# Patient Record
Sex: FEMALE | Race: WHITE | NOT HISPANIC OR LATINO | URBAN - METROPOLITAN AREA
[De-identification: names, ages, dates, MRNs, and addresses within clinical notes are randomized per-mention and may not be internally consistent; named-entity substitution may affect disease eponyms.]

---

## 2023-04-19 PROBLEM — 12063002: Status: ACTIVE | Noted: 2023-04-19

## 2023-04-19 PROBLEM — 29857009: Status: ACTIVE | Noted: 2023-04-19

## 2023-04-19 PROBLEM — 428283002: Status: ACTIVE | Noted: 2023-04-19

## 2023-04-20 ENCOUNTER — WEB ENCOUNTER (OUTPATIENT)
Dept: URBAN - METROPOLITAN AREA CLINIC 19 | Facility: CLINIC | Age: 73
End: 2023-04-20

## 2023-04-20 ENCOUNTER — OFFICE VISIT (OUTPATIENT)
Dept: URBAN - METROPOLITAN AREA CLINIC 19 | Facility: CLINIC | Age: 73
End: 2023-04-20
Payer: COMMERCIAL

## 2023-04-20 ENCOUNTER — LAB OUTSIDE AN ENCOUNTER (OUTPATIENT)
Dept: URBAN - METROPOLITAN AREA CLINIC 19 | Facility: CLINIC | Age: 73
End: 2023-04-20

## 2023-04-20 VITALS
WEIGHT: 175.2 LBS | DIASTOLIC BLOOD PRESSURE: 89 MMHG | BODY MASS INDEX: 31.04 KG/M2 | TEMPERATURE: 97.2 F | HEIGHT: 63 IN | SYSTOLIC BLOOD PRESSURE: 150 MMHG

## 2023-04-20 DIAGNOSIS — K62.5 RECTAL BLEED: ICD-10-CM

## 2023-04-20 DIAGNOSIS — R07.89 OTHER CHEST PAIN: ICD-10-CM

## 2023-04-20 DIAGNOSIS — Z86.010 HISTORY OF COLON POLYPS: ICD-10-CM

## 2023-04-20 PROCEDURE — 99204 OFFICE O/P NEW MOD 45 MIN: CPT | Performed by: STUDENT IN AN ORGANIZED HEALTH CARE EDUCATION/TRAINING PROGRAM

## 2023-04-20 RX ORDER — SOD SULF/POT CHLORIDE/MAG SULF 1.479 G
AS DIRECTED TABLET ORAL ONCE
Refills: 0 | OUTPATIENT
Start: 2023-04-20 | End: 2023-04-21

## 2023-04-20 NOTE — HPI-TODAY'S VISIT:
4/20/2023:  Jan: The pt is a 74 yo F who presents as a referral from her PCP after an ER visit for chest pain.  Was referred to cardiology from the ER and is undergoing cardiac workup including stress test and AAA.  This is still pending from my understanding.  Was also prescribed pepcid which has helped with her symptoms.  Dr. Rasmussen, is the cardiologist she saw.   On simponi for RA and started having rectal bleed after starting the first dose of this medication.  No further bleeding after the initial dose.  Dr. Sandhu is her rheumatologist.  Suspected diverticular bleed.  Last colonoscopy was in 2013 with polyps.  She was recommended a colonoscopy in 5 years.  Unfortunately, she was given a cologuard test instead of a colonoscopy in 2018.  This was negative in 2018.  She was not tested thereafter.  Now she presents with rectal bleed and has been referred to us for a colonoscopy.  Other symptoms: Constipation  Smoking hx: Never Alcohol use: Social use FH of GI cancers: No known FH other than gallbladder issues.

## 2023-06-08 ENCOUNTER — OFFICE VISIT (OUTPATIENT)
Dept: URBAN - METROPOLITAN AREA SURGERY CENTER 31 | Facility: SURGERY CENTER | Age: 73
End: 2023-06-08
Payer: COMMERCIAL

## 2023-06-08 ENCOUNTER — CLAIMS CREATED FROM THE CLAIM WINDOW (OUTPATIENT)
Dept: URBAN - METROPOLITAN AREA CLINIC 4 | Facility: CLINIC | Age: 73
End: 2023-06-08
Payer: COMMERCIAL

## 2023-06-08 DIAGNOSIS — D12.2 ADENOMA OF ASCENDING COLON: ICD-10-CM

## 2023-06-08 DIAGNOSIS — D12.4 BENIGN NEOPLASM OF DESCENDING COLON: ICD-10-CM

## 2023-06-08 DIAGNOSIS — K63.89 OTHER SPECIFIED DISEASES OF INTESTINE: ICD-10-CM

## 2023-06-08 DIAGNOSIS — D12.4 ADENOMA OF DESCENDING COLON: ICD-10-CM

## 2023-06-08 DIAGNOSIS — K62.5 RECTAL BLEED: ICD-10-CM

## 2023-06-08 DIAGNOSIS — R07.9 CHEST PAIN: ICD-10-CM

## 2023-06-08 PROCEDURE — 88305 TISSUE EXAM BY PATHOLOGIST: CPT | Performed by: PATHOLOGY

## 2023-06-08 PROCEDURE — 45380 COLONOSCOPY AND BIOPSY: CPT | Performed by: STUDENT IN AN ORGANIZED HEALTH CARE EDUCATION/TRAINING PROGRAM

## 2023-06-08 PROCEDURE — 45385 COLONOSCOPY W/LESION REMOVAL: CPT | Performed by: STUDENT IN AN ORGANIZED HEALTH CARE EDUCATION/TRAINING PROGRAM

## 2023-06-08 PROCEDURE — 43239 EGD BIOPSY SINGLE/MULTIPLE: CPT | Performed by: STUDENT IN AN ORGANIZED HEALTH CARE EDUCATION/TRAINING PROGRAM

## 2023-06-08 PROCEDURE — G8907 PT DOC NO EVENTS ON DISCHARG: HCPCS | Performed by: STUDENT IN AN ORGANIZED HEALTH CARE EDUCATION/TRAINING PROGRAM

## 2023-06-12 ENCOUNTER — LAB OUTSIDE AN ENCOUNTER (OUTPATIENT)
Dept: URBAN - METROPOLITAN AREA SURGERY CENTER 13 | Facility: SURGERY CENTER | Age: 73
End: 2023-06-12

## 2023-08-03 ENCOUNTER — WEB ENCOUNTER (OUTPATIENT)
Dept: URBAN - METROPOLITAN AREA CLINIC 19 | Facility: CLINIC | Age: 73
End: 2023-08-03

## 2023-08-03 ENCOUNTER — OFFICE VISIT (OUTPATIENT)
Dept: URBAN - METROPOLITAN AREA CLINIC 19 | Facility: CLINIC | Age: 73
End: 2023-08-03
Payer: COMMERCIAL

## 2023-08-03 VITALS
HEART RATE: 78 BPM | WEIGHT: 176.8 LBS | SYSTOLIC BLOOD PRESSURE: 122 MMHG | TEMPERATURE: 98.2 F | HEIGHT: 63 IN | DIASTOLIC BLOOD PRESSURE: 78 MMHG | BODY MASS INDEX: 31.33 KG/M2

## 2023-08-03 DIAGNOSIS — R07.89 OTHER CHEST PAIN: ICD-10-CM

## 2023-08-03 DIAGNOSIS — K44.9 HIATAL HERNIA: ICD-10-CM

## 2023-08-03 DIAGNOSIS — K59.09 OTHER CONSTIPATION: ICD-10-CM

## 2023-08-03 DIAGNOSIS — Z86.010 HISTORY OF COLON POLYPS: ICD-10-CM

## 2023-08-03 PROBLEM — 84089009: Status: ACTIVE | Noted: 2023-08-03

## 2023-08-03 PROBLEM — 14760008: Status: ACTIVE | Noted: 2023-08-03

## 2023-08-03 PROCEDURE — 99213 OFFICE O/P EST LOW 20 MIN: CPT | Performed by: STUDENT IN AN ORGANIZED HEALTH CARE EDUCATION/TRAINING PROGRAM

## 2023-08-03 RX ORDER — ATORVASTATIN CALCIUM 20 MG/1
1 TABLET TABLET, FILM COATED ORAL ONCE A DAY
Status: ACTIVE | COMMUNITY

## 2023-08-03 RX ORDER — LEVOTHYROXINE SODIUM 100 UG/1
1 TABLET IN THE MORNING ON AN EMPTY STOMACH TABLET ORAL ONCE A DAY
Status: ACTIVE | COMMUNITY

## 2023-08-03 RX ORDER — TRAZODONE HYDROCHLORIDE 50 MG/1
1 TABLET AT BEDTIME AS NEEDED TABLET ORAL ONCE A DAY
Status: ACTIVE | COMMUNITY

## 2023-08-03 RX ORDER — TRAMADOL HYDROCHLORIDE 200 MG/1
1 TABLET TABLET, EXTENDED RELEASE ORAL ONCE A DAY
Status: ACTIVE | COMMUNITY

## 2023-08-03 RX ORDER — CHOLECALCIFEROL (VITAMIN D3) 1MM UNIT/G
AS DIRECTED LIQUID (ML) MISCELLANEOUS
Status: ACTIVE | COMMUNITY

## 2023-08-03 RX ORDER — CYANOCOBALAMIN (VITAMIN B-12) 3000MCG/ML
AS DIRECTED DROPS SUBLINGUAL
Status: ACTIVE | COMMUNITY

## 2023-08-03 RX ORDER — TIZANIDINE 4 MG/1
1 TABLET AS NEEDED TABLET ORAL THREE TIMES A DAY
Status: ACTIVE | COMMUNITY

## 2023-08-03 RX ORDER — FLUTICASONE PROPIONATE 50 UG/1
1 SPRAY IN EACH NOSTRIL SPRAY, METERED NASAL ONCE A DAY
Status: ACTIVE | COMMUNITY

## 2023-08-03 RX ORDER — ESCITALOPRAM OXALATE 10 MG/1
1 TABLET TABLET ORAL ONCE A DAY
Status: ACTIVE | COMMUNITY

## 2023-08-03 RX ORDER — TRAMADOL HYDROCHLORIDE 50 MG/1
1 TABLET AS NEEDED TABLET, FILM COATED ORAL ONCE A DAY
Status: ACTIVE | COMMUNITY

## 2023-08-03 NOTE — HPI-TODAY'S VISIT:
8/3/2023:  Lucianaeenath: The pt had a small hiatal hernia.  Her chest pain symptoms were improved on treatment with famotidine.  EGD was normal.  A manometry study was recommended after the procedure and an order tentatively placed.  The patient declined the procedure.  She is here in clinic to discuss her findings.  Discussed the findings.  Had some questions about her chronic constipation.  Discussed squatty potty, high fiber diet and water intake.  ================================= 6/8/2023:  Lucianaeenath: EGD - Small hiatal hernia but otherwise normal exam. Colonoscopy - Hemorrhoids, diverticulosis and 2 small and one 10 mm polyp removed.  3 year recall recommended.  4/20/2023:  Mauriath: The pt is a 74 yo F who presents as a referral from her PCP after an ER visit for chest pain.  Was referred to cardiology from the ER and is undergoing cardiac workup including stress test and AAA.  This is still pending from my understanding.  Was also prescribed pepcid which has helped with her symptoms.  Dr. Rasmussen is the cardiologist she saw.   On simponi for RA and started having rectal bleed after starting the first dose of this medication.  No further bleeding after the initial dose.  Dr. Sandhu is her rheumatologist.  Suspected diverticular bleed.  Last colonoscopy was in 2013 with polyps.  She was recommended a colonoscopy in 5 years.  Unfortunately, she was given a cologuard test instead of a colonoscopy in 2018.  This was negative in 2018.  She was not tested thereafter.  Now she presents with rectal bleed and has been referred to us for a colonoscopy.  Other symptoms: Constipation  Smoking hx: Never Alcohol use: Social use FH of GI cancers: No known FH other than gallbladder issues.

## 2023-10-04 ENCOUNTER — OFFICE VISIT (OUTPATIENT)
Dept: URBAN - METROPOLITAN AREA CLINIC 19 | Facility: CLINIC | Age: 73
End: 2023-10-04
Payer: COMMERCIAL

## 2023-10-04 VITALS
WEIGHT: 177.6 LBS | DIASTOLIC BLOOD PRESSURE: 82 MMHG | SYSTOLIC BLOOD PRESSURE: 148 MMHG | HEIGHT: 63 IN | HEART RATE: 71 BPM | TEMPERATURE: 97.8 F | BODY MASS INDEX: 31.47 KG/M2

## 2023-10-04 DIAGNOSIS — K59.09 OTHER CONSTIPATION: ICD-10-CM

## 2023-10-04 DIAGNOSIS — R07.89 OTHER CHEST PAIN: ICD-10-CM

## 2023-10-04 DIAGNOSIS — Z86.010 HISTORY OF COLON POLYPS: ICD-10-CM

## 2023-10-04 DIAGNOSIS — K62.5 RECTAL BLEED: ICD-10-CM

## 2023-10-04 DIAGNOSIS — G89.4 CHRONIC PAIN SYNDROME: ICD-10-CM

## 2023-10-04 PROBLEM — 373621006: Status: ACTIVE | Noted: 2023-10-04

## 2023-10-04 PROCEDURE — 99214 OFFICE O/P EST MOD 30 MIN: CPT | Performed by: STUDENT IN AN ORGANIZED HEALTH CARE EDUCATION/TRAINING PROGRAM

## 2023-10-04 RX ORDER — FLUTICASONE PROPIONATE 50 UG/1
1 SPRAY IN EACH NOSTRIL SPRAY, METERED NASAL ONCE A DAY
Status: ACTIVE | COMMUNITY

## 2023-10-04 RX ORDER — ESCITALOPRAM OXALATE 10 MG/1
1 TABLET TABLET ORAL ONCE A DAY
Status: ACTIVE | COMMUNITY

## 2023-10-04 RX ORDER — TIZANIDINE 4 MG/1
1 TABLET AS NEEDED TABLET ORAL THREE TIMES A DAY
Status: ACTIVE | COMMUNITY

## 2023-10-04 RX ORDER — CYANOCOBALAMIN (VITAMIN B-12) 3000MCG/ML
AS DIRECTED DROPS SUBLINGUAL
Status: ACTIVE | COMMUNITY

## 2023-10-04 RX ORDER — CHOLECALCIFEROL (VITAMIN D3) 1MM UNIT/G
AS DIRECTED LIQUID (ML) MISCELLANEOUS
Status: ACTIVE | COMMUNITY

## 2023-10-04 RX ORDER — TRAMADOL HYDROCHLORIDE 200 MG/1
1 TABLET TABLET, EXTENDED RELEASE ORAL ONCE A DAY
Status: ACTIVE | COMMUNITY

## 2023-10-04 RX ORDER — ATORVASTATIN CALCIUM 20 MG/1
1 TABLET TABLET, FILM COATED ORAL ONCE A DAY
Status: ACTIVE | COMMUNITY

## 2023-10-04 RX ORDER — TRAMADOL HYDROCHLORIDE 50 MG/1
1 TABLET AS NEEDED TABLET, FILM COATED ORAL ONCE A DAY
Status: ACTIVE | COMMUNITY

## 2023-10-04 RX ORDER — TRAZODONE HYDROCHLORIDE 50 MG/1
1 TABLET AT BEDTIME AS NEEDED TABLET ORAL ONCE A DAY
Status: ACTIVE | COMMUNITY

## 2023-10-04 RX ORDER — LEVOTHYROXINE SODIUM 100 UG/1
1 TABLET IN THE MORNING ON AN EMPTY STOMACH TABLET ORAL ONCE A DAY
Status: ACTIVE | COMMUNITY

## 2024-02-08 ENCOUNTER — OV EP (OUTPATIENT)
Dept: URBAN - METROPOLITAN AREA CLINIC 19 | Facility: CLINIC | Age: 74
End: 2024-02-08
Payer: COMMERCIAL

## 2024-02-08 VITALS
HEART RATE: 69 BPM | DIASTOLIC BLOOD PRESSURE: 84 MMHG | BODY MASS INDEX: 31.08 KG/M2 | WEIGHT: 175.4 LBS | TEMPERATURE: 97 F | OXYGEN SATURATION: 96 % | SYSTOLIC BLOOD PRESSURE: 138 MMHG | HEIGHT: 63 IN

## 2024-02-08 DIAGNOSIS — K59.09 OTHER CONSTIPATION: ICD-10-CM

## 2024-02-08 DIAGNOSIS — R07.89 OTHER CHEST PAIN: ICD-10-CM

## 2024-02-08 DIAGNOSIS — K62.5 RECTAL BLEED: ICD-10-CM

## 2024-02-08 DIAGNOSIS — Z86.010 HISTORY OF COLON POLYPS: ICD-10-CM

## 2024-02-08 DIAGNOSIS — G89.4 CHRONIC PAIN SYNDROME: ICD-10-CM

## 2024-02-08 PROCEDURE — 99213 OFFICE O/P EST LOW 20 MIN: CPT | Performed by: STUDENT IN AN ORGANIZED HEALTH CARE EDUCATION/TRAINING PROGRAM

## 2024-02-08 RX ORDER — CYANOCOBALAMIN (VITAMIN B-12) 3000MCG/ML
AS DIRECTED DROPS SUBLINGUAL
Status: ON HOLD | COMMUNITY

## 2024-02-08 RX ORDER — CHOLECALCIFEROL (VITAMIN D3) 1MM UNIT/G
AS DIRECTED LIQUID (ML) MISCELLANEOUS
Status: ON HOLD | COMMUNITY

## 2024-02-08 RX ORDER — DOCUSATE SODIUM 100 MG/1
2 CAPSULE IN AM CAPSULE ORAL ONCE A DAY
Status: ACTIVE | COMMUNITY

## 2024-02-08 RX ORDER — TRAZODONE HYDROCHLORIDE 50 MG/1
1 TABLET AT BEDTIME AS NEEDED TABLET ORAL ONCE A DAY
Status: ACTIVE | COMMUNITY

## 2024-02-08 RX ORDER — TRAMADOL HYDROCHLORIDE 200 MG/1
1 TABLET TABLET, EXTENDED RELEASE ORAL ONCE A DAY
Status: ACTIVE | COMMUNITY

## 2024-02-08 RX ORDER — STANDARDIZED SENNA CONCENTRATE 8.6 MG/1
2 TABLETS AT BEDTIME AS NEEDED TABLET ORAL ONCE A DAY
Status: ACTIVE | COMMUNITY

## 2024-02-08 RX ORDER — LEVOTHYROXINE SODIUM 100 UG/1
1 TABLET IN THE MORNING ON AN EMPTY STOMACH TABLET ORAL ONCE A DAY
Status: ACTIVE | COMMUNITY

## 2024-02-08 RX ORDER — TIZANIDINE 4 MG/1
1 TABLET AS NEEDED TABLET ORAL THREE TIMES A DAY
Status: ACTIVE | COMMUNITY

## 2024-02-08 RX ORDER — ESCITALOPRAM OXALATE 10 MG/1
1 TABLET TABLET ORAL ONCE A DAY
Status: ACTIVE | COMMUNITY

## 2024-02-08 RX ORDER — ATORVASTATIN CALCIUM 20 MG/1
1 TABLET TABLET, FILM COATED ORAL ONCE A DAY
Status: ACTIVE | COMMUNITY

## 2024-02-08 RX ORDER — FLUTICASONE PROPIONATE 50 UG/1
1 SPRAY IN EACH NOSTRIL SPRAY, METERED NASAL ONCE A DAY
Status: ACTIVE | COMMUNITY

## 2024-02-08 RX ORDER — TRAMADOL HYDROCHLORIDE 50 MG/1
1 TABLET AS NEEDED TABLET, FILM COATED ORAL ONCE A DAY
Status: ACTIVE | COMMUNITY

## 2024-02-08 NOTE — HPI-TODAY'S VISIT:
2/8/2024:Jan: The patient is a 73-year-old female with chronic constipation, diverticulosis and internal hemorrhoids with rectal bleed when exposed to Simponi and meloxicam.  She is status post colonoscopy in June 2023 with hemorrhoids and diverticulosis as well as multiple small polyps removed.  Recommended 3-year recall.  She also had concerns of atypical chest pain status post EGD with a small hiatal hernia but otherwise normal upper endoscopy.  Has been on a lot of opiates for her RA.  More recently was prescribed senna coat by her pain medicine provider.  Here for follow-up.  Pt reports she had constipation but is now having diarrhea.  Has been on tramadol.  Has been told to discontinue her sennakot and take a stool softner instead.  Having her shoulder replaced in a few weeks.  ===============  10/4/2023: Jan: The patient is a 73-year-old female with atypical chest pain status post EGD with findings of a small hiatal hernia, but otherwise normal exam.  She was put on famotidine which seemed to be working for her.  She also had concerns regarding constipation.  She underwent a colonoscopy with 3 polyps removed and a 3-year recall recommended.  Diverticular disease was noted along with internal hemorrhoids.  Patient had also been told by her rheumatologist that her being on Simponi may have contributed to concerns of rectal bleed as no active bleeding was noted.  Since no other concerns on colonoscopy, the likely source of her rectal bleed was diverticulosis or hemorrhoids.  Lifestyle modifications including high-fiber diet, adequate water intake was recommended.  The plan was that if lifestyle modifications were not beneficial, dyssynergia work-up will be initiated.  She is here for her 2-month follow-up to discuss further management.  No improvement with stool habits with dietary changes.    She is now on meloxicam for knee pain.    On sennakot at the advice of her pain physician.  Plan: Minimize opiates.  Continue Senokot.  Return to clinic for routine follow-up and 6 to 12 months.  ============== 8/3/2023:  Jan: The pt had a small hiatal hernia.  Her chest pain symptoms were improved on treatment with famotidine.  EGD was normal.  A manometry study was recommended after the procedure and an order tentatively placed.  The patient declined the procedure.  She is here in clinic to discuss her findings.  Discussed the findings.  Had some questions about her chronic constipation.  Discussed squatty potty, high fiber diet and water intake.  ================================= 6/8/2023:  Mauriath: EGD - Small hiatal hernia but otherwise normal exam. Colonoscopy - Hemorrhoids, diverticulosis and 2 small and one 10 mm polyp removed.  3 year recall recommended.  4/20/2023:  Mauriath: The pt is a 74 yo F who presents as a referral from her PCP after an ER visit for chest pain.  Was referred to cardiology from the ER and is undergoing cardiac workup including stress test and AAA.  This is still pending from my understanding.  Was also prescribed pepcid which has helped with her symptoms.  Dr. Rasmussen is the cardiologist she saw.   On simponi for RA and started having rectal bleed after starting the first dose of this medication.  No further bleeding after the initial dose.  Dr. Sandhu is her rheumatologist.  Suspected diverticular bleed.  Last colonoscopy was in 2013 with polyps.  She was recommended a colonoscopy in 5 years.  Unfortunately, she was given a cologuard test instead of a colonoscopy in 2018.  This was negative in 2018.  She was not tested thereafter.  Now she presents with rectal bleed and has been referred to us for a colonoscopy.  Other symptoms: Constipation  Smoking hx: Never Alcohol use: Social use FH of GI cancers: No known FH other than gallbladder issues.   ===============